# Patient Record
Sex: FEMALE | Race: WHITE
[De-identification: names, ages, dates, MRNs, and addresses within clinical notes are randomized per-mention and may not be internally consistent; named-entity substitution may affect disease eponyms.]

---

## 2018-03-13 ENCOUNTER — HOSPITAL ENCOUNTER (OUTPATIENT)
Dept: HOSPITAL 89 - LAB | Age: 23
End: 2018-03-13
Attending: NURSE PRACTITIONER
Payer: OTHER GOVERNMENT

## 2018-03-13 DIAGNOSIS — J02.9: ICD-10-CM

## 2018-03-13 DIAGNOSIS — Z11.3: Primary | ICD-10-CM

## 2018-03-13 PROCEDURE — 36415 COLL VENOUS BLD VENIPUNCTURE: CPT

## 2018-03-13 PROCEDURE — 87491 CHLMYD TRACH DNA AMP PROBE: CPT

## 2018-03-13 PROCEDURE — 86703 HIV-1/HIV-2 1 RESULT ANTBDY: CPT

## 2018-03-13 PROCEDURE — 87081 CULTURE SCREEN ONLY: CPT

## 2018-03-13 PROCEDURE — 87591 N.GONORRHOEAE DNA AMP PROB: CPT

## 2018-05-14 ENCOUNTER — HOSPITAL ENCOUNTER (OUTPATIENT)
Dept: HOSPITAL 89 - LAB | Age: 23
End: 2018-05-14
Attending: NURSE PRACTITIONER
Payer: OTHER GOVERNMENT

## 2018-05-14 DIAGNOSIS — Z71.89: ICD-10-CM

## 2018-05-14 DIAGNOSIS — Z11.3: Primary | ICD-10-CM

## 2018-05-14 PROCEDURE — 86703 HIV-1/HIV-2 1 RESULT ANTBDY: CPT

## 2018-05-14 PROCEDURE — 36415 COLL VENOUS BLD VENIPUNCTURE: CPT

## 2018-05-14 PROCEDURE — 87340 HEPATITIS B SURFACE AG IA: CPT

## 2018-11-07 ENCOUNTER — HOSPITAL ENCOUNTER (OUTPATIENT)
Dept: HOSPITAL 89 - LAB | Age: 23
End: 2018-11-07
Attending: NURSE PRACTITIONER
Payer: OTHER GOVERNMENT

## 2018-11-07 DIAGNOSIS — R19.7: Primary | ICD-10-CM

## 2018-11-07 LAB — PLATELET COUNT, AUTOMATED: 192 K/UL (ref 150–450)

## 2018-11-07 PROCEDURE — 84460 ALANINE AMINO (ALT) (SGPT): CPT

## 2018-11-07 PROCEDURE — 82310 ASSAY OF CALCIUM: CPT

## 2018-11-07 PROCEDURE — 82040 ASSAY OF SERUM ALBUMIN: CPT

## 2018-11-07 PROCEDURE — 85025 COMPLETE CBC W/AUTO DIFF WBC: CPT

## 2018-11-07 PROCEDURE — 82947 ASSAY GLUCOSE BLOOD QUANT: CPT

## 2018-11-07 PROCEDURE — 84132 ASSAY OF SERUM POTASSIUM: CPT

## 2018-11-07 PROCEDURE — 84295 ASSAY OF SERUM SODIUM: CPT

## 2018-11-07 PROCEDURE — 83516 IMMUNOASSAY NONANTIBODY: CPT

## 2018-11-07 PROCEDURE — 84155 ASSAY OF PROTEIN SERUM: CPT

## 2018-11-07 PROCEDURE — 82565 ASSAY OF CREATININE: CPT

## 2018-11-07 PROCEDURE — 84450 TRANSFERASE (AST) (SGOT): CPT

## 2018-11-07 PROCEDURE — 82247 BILIRUBIN TOTAL: CPT

## 2018-11-07 PROCEDURE — 84520 ASSAY OF UREA NITROGEN: CPT

## 2018-11-07 PROCEDURE — 85651 RBC SED RATE NONAUTOMATED: CPT

## 2018-11-07 PROCEDURE — 36415 COLL VENOUS BLD VENIPUNCTURE: CPT

## 2018-11-07 PROCEDURE — 82374 ASSAY BLOOD CARBON DIOXIDE: CPT

## 2018-11-07 PROCEDURE — 86140 C-REACTIVE PROTEIN: CPT

## 2018-11-07 PROCEDURE — 84075 ASSAY ALKALINE PHOSPHATASE: CPT

## 2018-11-07 PROCEDURE — 82435 ASSAY OF BLOOD CHLORIDE: CPT

## 2019-03-29 ENCOUNTER — HOSPITAL ENCOUNTER (EMERGENCY)
Dept: HOSPITAL 89 - ER | Age: 24
LOS: 1 days | Discharge: HOME | End: 2019-03-30
Payer: OTHER GOVERNMENT

## 2019-03-29 DIAGNOSIS — R10.30: Primary | ICD-10-CM

## 2019-03-29 LAB — PLATELET COUNT, AUTOMATED: 199 K/UL (ref 150–450)

## 2019-03-29 PROCEDURE — 83690 ASSAY OF LIPASE: CPT

## 2019-03-29 PROCEDURE — 84703 CHORIONIC GONADOTROPIN ASSAY: CPT

## 2019-03-29 PROCEDURE — 84295 ASSAY OF SERUM SODIUM: CPT

## 2019-03-29 PROCEDURE — 84155 ASSAY OF PROTEIN SERUM: CPT

## 2019-03-29 PROCEDURE — 82310 ASSAY OF CALCIUM: CPT

## 2019-03-29 PROCEDURE — 82150 ASSAY OF AMYLASE: CPT

## 2019-03-29 PROCEDURE — 82374 ASSAY BLOOD CARBON DIOXIDE: CPT

## 2019-03-29 PROCEDURE — 84450 TRANSFERASE (AST) (SGOT): CPT

## 2019-03-29 PROCEDURE — 82947 ASSAY GLUCOSE BLOOD QUANT: CPT

## 2019-03-29 PROCEDURE — 84075 ASSAY ALKALINE PHOSPHATASE: CPT

## 2019-03-29 PROCEDURE — 82435 ASSAY OF BLOOD CHLORIDE: CPT

## 2019-03-29 PROCEDURE — 96361 HYDRATE IV INFUSION ADD-ON: CPT

## 2019-03-29 PROCEDURE — 84460 ALANINE AMINO (ALT) (SGPT): CPT

## 2019-03-29 PROCEDURE — 82565 ASSAY OF CREATININE: CPT

## 2019-03-29 PROCEDURE — 74177 CT ABD & PELVIS W/CONTRAST: CPT

## 2019-03-29 PROCEDURE — 84520 ASSAY OF UREA NITROGEN: CPT

## 2019-03-29 PROCEDURE — 82247 BILIRUBIN TOTAL: CPT

## 2019-03-29 PROCEDURE — 99284 EMERGENCY DEPT VISIT MOD MDM: CPT

## 2019-03-29 PROCEDURE — 82040 ASSAY OF SERUM ALBUMIN: CPT

## 2019-03-29 PROCEDURE — 85025 COMPLETE CBC W/AUTO DIFF WBC: CPT

## 2019-03-29 PROCEDURE — 84132 ASSAY OF SERUM POTASSIUM: CPT

## 2019-03-29 PROCEDURE — 96374 THER/PROPH/DIAG INJ IV PUSH: CPT

## 2019-03-29 NOTE — ER REPORT
History and Physical


Time Seen By MD:  23:11


Hx. of Stated Complaint:  


lower abdominal cramping. has been going on a long time. been taking miralax


HPI/ROS


CHIEF COMPLAINT: Abdominal cramping





HISTORY OF PRESENT ILLNESS: This is a 23 year old female. She has had abdominal 


cramping the last few days. Has had some chronic abdominal problems, currently 


awaiting to get into gastroenterology. Had been on dicyclomine at times. Had 


tried Miralax yesterday, but then had some diarrhea today. Worsening pain across


the lower abdomen. No problems with urination. No vomiting.





REVIEW OF SYSTEMS:


Constitutional: No fever or chills.


Cardiovascular: No chest pain.


Respiratory: No shortness of breath.


Genitourinary: No dysuria. No frequency


Musculoskeletal: No musculoskeletal pain.


Skin: No rashes.


Neurological: No numbness. No weakness.


Allergies:  


Coded Allergies:  


     codeine (Verified  Allergy, Severe, "UNRESPONSIVE, 3/29/19)


Home Meds


Active Scripts


Ketorolac Tromethamine (KETOROLAC TROMETHAMINE) 10 Mg Tab, 10 MG PO Q6H PRN for 


PAIN, #12 TAB 0 Refills


   Prov:MARTIN KRAMER MD         3/30/19


Dicyclomine Hcl (DICYCLOMINE HCL) 20 Mg Tablet, 1 TAB PO QID, #120 TAB 0 Refills


   Prov:KILLIAN ZARAGOZA DNP, FNP-BC         1/29/19


Norgestimate-Ethinyl Estradiol (TRI-SPRINTEC) 1 Each Tablet, 1 EACH PO QDAY, #3 


PACK 4 Refills


   Prov:KILLIAN ZARAGOZA DNP, FNP-BC         7/10/18


Discontinued Scripts


Omeprazole (OMEPRAZOLE) 20 Mg Tablet.dr, 1 TAB PO BID for 30 Days, #60 TAB 0 


Refills


   Prov:KILLIAN ZARAGOZA DNP, FNP-BC         1/29/19


Buspirone Hcl (BUSPIRONE HCL) 7.5 Mg Tablet, 1 TAB PO BID, #60 TAB 90 Refills


   Take 1 tab twice daily. In 2-3 days, may increase to 1.5 tabs twice


   daily, if needed.


   Prov:KILLIAN ZARAGOZA DNP, FNP-BC         8/25/17


Reviewed Nurses Notes:  Yes


Hx Smoking:  No


Smoking Status:  Never Smoker


Constitutional





Vital Sign - Last 24 Hours








 3/29/19 3/29/19 3/29/19 3/29/19





 23:04 23:07 23:10 23:25


 


Temp 98.0   


 


Pulse 65  ??? 68


 


Resp 12   


 


B/P (MAP) 143/100 143/100 (114)  


 


Pulse Ox 95   95


 


O2 Delivery Room Air   


 


    





 3/29/19 3/29/19 3/29/19 3/30/19





 23:30 23:40 23:55 00:00


 


Pulse  54 55 


 


B/P (MAP) 125/78 (94)   125/79 (94)


 


Pulse Ox  94 96 





 3/30/19 3/30/19 3/30/19 3/30/19





 00:10 00:13 00:25 00:30


 


Pulse ???  59 


 


B/P (MAP)  130/72 (91)  121/72 (88)


 


Pulse Ox   95 





 3/30/19 3/30/19 3/30/19 3/30/19





 00:35 00:50 01:00 01:05


 


Pulse 66 58  59


 


B/P (MAP)   122/72 (89) 


 


Pulse Ox 100 97  96





 3/30/19 3/30/19 3/30/19 3/30/19





 01:20 01:30 01:35 01:50


 


Pulse ???  61 55


 


B/P (MAP)  110/88 (95)  


 


Pulse Ox 94  96 88








Physical Exam


   General Appearance: The patient is alert. No acute distress. 


Eyes: Pupils are equal, round. No pallor, injection or icterus. 


ENT: Mucous membranes are moist. 


Respiratory: Lungs are clear to auscultation.


Cardiovascular: Regular rate and rhythm. No murmurs, gallops or rubs. 


Gastrointestinal: Abdomen is soft, discomfort throughout the lower abdomen. 


Guarding, but no rebound. Nondistended. No masses or organomegaly. No 


costovertebral angle tenderness with percussion.


Neurological: Alert and oriented x3. No focal neurologic deficits


Skin: Warm and dry. No rashes.


Musculoskeletal: No tenderness of the spinous processes, mild discomfort with 


palpation/percusion of left lumbar area.





DIFFERENTIAL DIAGNOSIS: After history and physical exam, differential diagnosis 


was considered for abdominal pain including but not limited to appendicitis, 


cholecystitis, gastritis and urinary tract infection.





Medical Decision Making


Data Points


Result Diagram:  


3/29/19 2311                                                                    


           3/29/19 2311





Laboratory





Hematology








Test


 3/29/19


23:11 3/30/19


00:58


 


Red Blood Count


 5.02 M/uL


(4.17-5.56) 





 


Mean Corpuscular Volume


 91.0 fL


(80.0-96.0) 





 


Mean Corpuscular Hemoglobin


 30.9 pg


(26.0-33.0) 





 


Mean Corpuscular Hemoglobin


Concent 34.0 g/dL


(32.0-36.0) 





 


Red Cell Distribution Width


 13.1 %


(11.5-14.5) 





 


Mean Platelet Volume


 8.3 fL


(7.2-11.1) 





 


Neutrophils (%) (Auto)


 60.4 %


(39.4-72.5) 





 


Lymphocytes (%) (Auto)


 30.3 %


(17.6-49.6) 





 


Monocytes (%) (Auto)


 7.7 %


(4.1-12.4) 





 


Eosinophils (%) (Auto)


 0.4 %


(0.4-6.7) 





 


Basophils (%) (Auto)


 1.2 %


(0.3-1.4) 





 


Nucleated RBC Relative Count


(auto) 0.1 /100WBC 


 





 


Neutrophils # (Auto)


 3.4 K/uL


(2.0-7.4) 





 


Lymphocytes # (Auto)


 1.7 K/uL


(1.3-3.6) 





 


Monocytes # (Auto)


 0.4 K/uL


(0.3-1.0) 





 


Eosinophils # (Auto)


 0.0 K/uL


(0.0-0.5) 





 


Basophils # (Auto)


 0.1 K/uL


(0.0-0.1) 





 


Nucleated RBC Absolute Count


(auto) 0.00 K/uL 


 





 


Sodium Level


 138 mmol/L


(137-145) 





 


Potassium Level


 3.6 mmol/L


(3.5-5.0) 





 


Chloride Level


 102 mmol/L


() 





 


Carbon Dioxide Level


 25 mmol/L


(22-31) 





 


Blood Urea Nitrogen


 12 mg/dl


(7-18) 





 


Creatinine


 0.80 mg/dl


(0.52-1.04) 





 


Glomerular Filtration Rate


Calc > 60.0 


 





 


Random Glucose


 90 mg/dl


() 





 


Calcium Level


 9.7 mg/dl


(8.4-10.2) 





 


Total Bilirubin


 0.2 mg/dl


(0.2-1.3) 





 


Aspartate Amino Transf


(AST/SGOT) 27 U/L (0-35) 


 





 


Alanine Aminotransferase


(ALT/SGPT) 25 U/L (0-56) 


 





 


Alkaline Phosphatase 61 U/L (0-126)  


 


Total Protein


 8.1 g/dl


(6.3-8.2) 





 


Albumin


 4.8 g/dl


(3.5-5.0) 





 


Human Chorionic Gonadotropin,


Qual Negative


(NEGATIVE) 





 


Amylase Level  77 U/L (0-110) 


 


Lipase


 


 75 U/L


()








Chemistry








Test


 3/29/19


23:11 3/30/19


00:58


 


White Blood Count


 5.7 k/uL


(4.5-11.0) 





 


Red Blood Count


 5.02 M/uL


(4.17-5.56) 





 


Hemoglobin


 15.5 g/dL


(12.0-16.0) 





 


Hematocrit


 45.7 %


(34.0-47.0) 





 


Mean Corpuscular Volume


 91.0 fL


(80.0-96.0) 





 


Mean Corpuscular Hemoglobin


 30.9 pg


(26.0-33.0) 





 


Mean Corpuscular Hemoglobin


Concent 34.0 g/dL


(32.0-36.0) 





 


Red Cell Distribution Width


 13.1 %


(11.5-14.5) 





 


Platelet Count


 199 K/uL


(150-450) 





 


Mean Platelet Volume


 8.3 fL


(7.2-11.1) 





 


Neutrophils (%) (Auto)


 60.4 %


(39.4-72.5) 





 


Lymphocytes (%) (Auto)


 30.3 %


(17.6-49.6) 





 


Monocytes (%) (Auto)


 7.7 %


(4.1-12.4) 





 


Eosinophils (%) (Auto)


 0.4 %


(0.4-6.7) 





 


Basophils (%) (Auto)


 1.2 %


(0.3-1.4) 





 


Nucleated RBC Relative Count


(auto) 0.1 /100WBC 


 





 


Neutrophils # (Auto)


 3.4 K/uL


(2.0-7.4) 





 


Lymphocytes # (Auto)


 1.7 K/uL


(1.3-3.6) 





 


Monocytes # (Auto)


 0.4 K/uL


(0.3-1.0) 





 


Eosinophils # (Auto)


 0.0 K/uL


(0.0-0.5) 





 


Basophils # (Auto)


 0.1 K/uL


(0.0-0.1) 





 


Nucleated RBC Absolute Count


(auto) 0.00 K/uL 


 





 


Glomerular Filtration Rate


Calc > 60.0 


 





 


Calcium Level


 9.7 mg/dl


(8.4-10.2) 





 


Total Bilirubin


 0.2 mg/dl


(0.2-1.3) 





 


Aspartate Amino Transf


(AST/SGOT) 27 U/L (0-35) 


 





 


Alanine Aminotransferase


(ALT/SGPT) 25 U/L (0-56) 


 





 


Alkaline Phosphatase 61 U/L (0-126)  


 


Total Protein


 8.1 g/dl


(6.3-8.2) 





 


Albumin


 4.8 g/dl


(3.5-5.0) 





 


Human Chorionic Gonadotropin,


Qual Negative


(NEGATIVE) 





 


Amylase Level  77 U/L (0-110) 


 


Lipase


 


 75 U/L


()











EKG/Imaging


Imaging


CT of the abdomen and pelvis with contrast:


 


Indication: Lower abdominal pain.


Technique: Helical CT was performed through the abdomen and pelvis following IV 


contrast enhancement with 75 cc of Isovue-370.  Multiplanar reconstructions are 


reviewed.


One of the following dose optimization techniques was utilized in the 


performance of this exam: Automated exposure control; adjustment of the mA 


and/or kV according to the patient's size; or use of an iterative reconstruction


technique. Specific details can be referenced in the facility's radiology CT 


exam operational policy.


Comparison: None available.


 


 


Lower lung fields: No parenchymal or pleural abnormality is identified.


 


Liver: Normal in size, shape, and density. The venous structures are 


unremarkable, as visualized.


 


Gallbladder/biliary tree: The gallbladder appears partially contracted, but 


otherwise unremarkable. The bile ducts are not dilated.


 


Pancreas: Normal in size, shape, and density.


 


Spleen: Normal in size, shape, and density.


 


Adrenal glands: Within normal limits.


 


Kidneys/urinary bladder: The kidneys are normal in size, shape, and density.


There are no signs of urinary tract calculus or obstructive uropathy.


The bladder appears homogeneous and unremarkable.


 


Intestinal structures: Unremarkable, as visualized. There are no signs of 


obstruction or focal inflammatory changes. The appendix is not clearly 


delineated, but there are no signs of inflammation or fluid in the right lower 


quadrant.


 


Pelvis: The uterus and adnexal structures are unremarkable, as visualized. There


is a small amount of free fluid in the cul-de-sac, which is a nonspecific 


finding. No circumscribed fluid collection is identified.


 


Aorta and vascular structures: Within normal limits.


 


Ascites or fluid collections: Small amount of free fluid in the cul-de-sac. 


Otherwise unremarkable.


 


Skeletal structures: Intact and unremarkable.


 


Impression: No acute process is clearly identified in the abdomen or pelvis. 


There is minimal free fluid in the cul-de-sac, which is a nonspecific finding.


 


Report Dictated By: Flash Shipman MD at 3/30/2019 12:39 AM





ED Course/Re-evaluation


Clinical Indication for ER IV:  IV Access


ED Course


Initial evaluation shows no problems on CT scan or labs. Patient did receive 


Toradol and a liter fluid and did feel a lot better. Recommended follow-up with 


gastroenterology, can continue to use MiraLAX as needed but at a lower dose 


daily. Can also continue to use dicyclomine if desired.


Decision to Disposition Date:  Mar 30, 2019


Decision to Disposition Time:  01:57





Depart


Departure


Latest Vital Signs





Vital Signs








  Date Time  Temp Pulse Resp B/P (MAP) Pulse Ox O2 Delivery O2 Flow Rate FiO2


 


3/30/19 01:50  55   88   


 


3/30/19 01:30    110/88 (95)    


 


3/29/19 23:04 98.0  12   Room Air  








Impression:  


   Primary Impression:  


   Abdominal pain


Condition:  Improved


Disposition:  HOME OR SELF-CARE


Referrals:  


KILLIAN ZARAGOZA DNP, FNP-BC (PCP)


New Scripts


Ketorolac Tromethamine (KETOROLAC TROMETHAMINE) 10 Mg Tab


10 MG PO Q6H PRN for PAIN, #12 TAB 0 Refills


   Prov: MARTIN KRAMER MD         3/30/19


Patient Instructions:  Abdominal Pain (ED)





Additional Instructions:  


We did not find any problems on labs or imaging tonight.


We recommend follow-up with Gastroenterology for further evaluation.


Use Toradol 10mg, one every 6 hours as needed for pain.





Problem Qualifiers








   Primary Impression:  


   Abdominal pain


   Abdominal location:  lower abdomen, unspecified  Qualified Codes:  R10.30 - 


   Lower abdominal pain, unspecified








MARTIN KRAMER MD             Mar 29, 2019 23:11

## 2019-03-30 VITALS — DIASTOLIC BLOOD PRESSURE: 88 MMHG | SYSTOLIC BLOOD PRESSURE: 110 MMHG

## 2019-03-30 NOTE — RADIOLOGY IMAGING REPORT
FACILITY: [facility] 

 

PATIENT NAME: Cris Chaidez

: 1995

MR: [id number]

V: [id number]

EXAM DATE: 

ORDERING PHYSICIAN: MARTIN KRAMER

TECHNOLOGIST: 

 

Location: [facility]

Patient: Cris Chaidez

: 1995

MRN: [id number]

Visit/Account:[id number]

Date of Sevice:  3/29/2019

 

ACCESSION #: [id number]

 

CT of the abdomen and pelvis with contrast:

 

Indication: Lower abdominal pain.

Technique: Helical CT was performed through the abdomen and pelvis following IV contrast enhancement 
with 75 cc of Isovue-370.  Multiplanar reconstructions are reviewed.

One of the following dose optimization techniques was utilized in the performance of this exam: Autom
ated exposure control; adjustment of the mA and/or kV according to the patient's size; or use of an i
terative reconstruction technique. Specific details can be referenced in the facility's radiology CT 
exam operational policy.

Comparison: None available.

 

 

Lower lung fields: No parenchymal or pleural abnormality is identified.

 

Liver: Normal in size, shape, and density. The venous structures are unremarkable, as visualized.

 

Gallbladder/biliary tree: The gallbladder appears partially contracted, but otherwise unremarkable. T
he bile ducts are not dilated.

 

Pancreas: Normal in size, shape, and density.

 

Spleen: Normal in size, shape, and density.

 

Adrenal glands: Within normal limits.

 

Kidneys/urinary bladder: The kidneys are normal in size, shape, and density.

There are no signs of urinary tract calculus or obstructive uropathy.

The bladder appears homogeneous and unremarkable.

 

Intestinal structures: Unremarkable, as visualized. There are no signs of obstruction or focal inflam
matory changes. The appendix is not clearly delineated, but there are no signs of inflammation or flu
id in the right lower quadrant.

 

Pelvis: The uterus and adnexal structures are unremarkable, as visualized. There is a small amount of
 free fluid in the cul-de-sac, which is a nonspecific finding. No circumscribed fluid collection is i
dentified.

 

Aorta and vascular structures: Within normal limits.

 

Ascites or fluid collections: Small amount of free fluid in the cul-de-sac. Otherwise unremarkable.

 

Skeletal structures: Intact and unremarkable.

 

 

Impression: No acute process is clearly identified in the abdomen or pelvis. There is minimal free fl
uid in the cul-de-sac, which is a nonspecific finding.

 

Report Dictated By: Flash Shipman MD at 3/30/2019 12:39 AM

 

Report E-Signed By: Flash Shipman MD  at 3/30/2019 12:47 AM

 

WSN:[id number]

## 2019-04-10 ENCOUNTER — HOSPITAL ENCOUNTER (OUTPATIENT)
Dept: HOSPITAL 89 - LAB | Age: 24
End: 2019-04-10
Attending: NURSE PRACTITIONER
Payer: OTHER GOVERNMENT

## 2019-04-10 DIAGNOSIS — Z11.59: Primary | ICD-10-CM

## 2019-04-10 PROCEDURE — 36415 COLL VENOUS BLD VENIPUNCTURE: CPT

## 2019-04-10 PROCEDURE — 86706 HEP B SURFACE ANTIBODY: CPT
